# Patient Record
Sex: MALE | Race: WHITE
[De-identification: names, ages, dates, MRNs, and addresses within clinical notes are randomized per-mention and may not be internally consistent; named-entity substitution may affect disease eponyms.]

---

## 2020-07-23 ENCOUNTER — HOSPITAL ENCOUNTER (OUTPATIENT)
Dept: HOSPITAL 75 - RAD FS | Age: 55
End: 2020-07-23
Attending: NURSE PRACTITIONER
Payer: COMMERCIAL

## 2020-07-23 DIAGNOSIS — M79.671: Primary | ICD-10-CM

## 2020-07-23 PROCEDURE — 73630 X-RAY EXAM OF FOOT: CPT

## 2020-07-23 NOTE — DIAGNOSTIC IMAGING REPORT
INDICATION: Right foot pain for 3 weeks.



TIME OF EXAM:  2:12 PM



No prior studies are available for comparison.



Metatarsals are intact. No periosteal reaction or stress reaction

is seen. Phalanges appear to be intact. Midfoot and hindfoot are

unremarkable.



IMPRESSION: No acute bony abnormality is detected.



Dictated by: 



  Dictated on workstation # [id number]